# Patient Record
Sex: FEMALE | Race: WHITE
[De-identification: names, ages, dates, MRNs, and addresses within clinical notes are randomized per-mention and may not be internally consistent; named-entity substitution may affect disease eponyms.]

---

## 2017-06-07 ENCOUNTER — HOSPITAL ENCOUNTER (OUTPATIENT)
Dept: HOSPITAL 45 - C.LABSPEC | Age: 32
Discharge: HOME | End: 2017-06-07
Attending: OBSTETRICS & GYNECOLOGY
Payer: COMMERCIAL

## 2017-06-07 DIAGNOSIS — Z34.90: Primary | ICD-10-CM

## 2017-06-07 LAB
APPEARANCE UR: CLEAR
BILIRUB UR-MCNC: (no result) MG/DL
COLOR UR: YELLOW
MANUAL MICROSCOPIC REQUIRED?: NO
NITRITE UR QL STRIP: (no result)
PH UR STRIP: 7 [PH] (ref 4.5–7.5)
REVIEW REQ?: NO
SP GR UR STRIP: 1.01 (ref 1–1.03)
URINE BILL WITH OR WITHOUT MIC: (no result)
UROBILINOGEN UR-MCNC: (no result) MG/DL

## 2017-06-15 ENCOUNTER — HOSPITAL ENCOUNTER (OUTPATIENT)
Dept: HOSPITAL 45 - C.PAPS | Age: 32
Discharge: HOME | End: 2017-06-15
Attending: OBSTETRICS & GYNECOLOGY
Payer: COMMERCIAL

## 2017-06-15 ENCOUNTER — HOSPITAL ENCOUNTER (OUTPATIENT)
Dept: HOSPITAL 45 - C.LAB1850 | Age: 32
Discharge: HOME | End: 2017-06-15
Attending: OBSTETRICS & GYNECOLOGY
Payer: COMMERCIAL

## 2017-06-15 DIAGNOSIS — Z34.90: Primary | ICD-10-CM

## 2017-06-15 DIAGNOSIS — Z01.419: Primary | ICD-10-CM

## 2017-06-15 LAB
BASOPHILS # BLD: 0.01 K/UL (ref 0–0.2)
BASOPHILS NFR BLD: 0.1 %
COMPLETE: YES
EOSINOPHIL NFR BLD AUTO: 204 K/UL (ref 130–400)
HCT VFR BLD CALC: 39.8 % (ref 37–47)
IG%: 0.1 %
IMM GRANULOCYTES NFR BLD AUTO: 20.2 %
LYMPHOCYTES # BLD: 1.39 K/UL (ref 1.2–3.4)
MCH RBC QN AUTO: 32.5 PG (ref 25–34)
MCHC RBC AUTO-ENTMCNC: 34.7 G/DL (ref 32–36)
MCV RBC AUTO: 93.6 FL (ref 80–100)
MONOCYTES NFR BLD: 5.5 %
NEUTROPHILS # BLD AUTO: 0.6 %
NEUTROPHILS NFR BLD AUTO: 73.5 %
PMV BLD AUTO: 10 FL (ref 7.4–10.4)
RBC # BLD AUTO: 4.25 M/UL (ref 4.2–5.4)
WBC # BLD AUTO: 6.89 K/UL (ref 4.8–10.8)

## 2017-06-16 LAB
CHLAMYDIA TRACH RNA***: NOT DETECTED
GC (NEIS GONORRHOEAE)RNA**: NOT DETECTED

## 2017-06-28 ENCOUNTER — HOSPITAL ENCOUNTER (OUTPATIENT)
Dept: HOSPITAL 45 - C.LAB1850 | Age: 32
Discharge: HOME | End: 2017-06-28
Attending: OBSTETRICS & GYNECOLOGY
Payer: COMMERCIAL

## 2017-06-28 DIAGNOSIS — Z01.818: Primary | ICD-10-CM

## 2017-06-28 LAB
BASOPHILS # BLD: 0.01 K/UL (ref 0–0.2)
BASOPHILS NFR BLD: 0.1 %
COMPLETE: YES
EOSINOPHIL NFR BLD AUTO: 224 K/UL (ref 130–400)
HCT VFR BLD CALC: 38.8 % (ref 37–47)
IG%: 0.1 %
IMM GRANULOCYTES NFR BLD AUTO: 17.6 %
LYMPHOCYTES # BLD: 1.51 K/UL (ref 1.2–3.4)
MCH RBC QN AUTO: 32.1 PG (ref 25–34)
MCHC RBC AUTO-ENTMCNC: 34.8 G/DL (ref 32–36)
MCV RBC AUTO: 92.4 FL (ref 80–100)
MONOCYTES NFR BLD: 3.1 %
NEUTROPHILS # BLD AUTO: 0.5 %
NEUTROPHILS NFR BLD AUTO: 78.6 %
PMV BLD AUTO: 9.5 FL (ref 7.4–10.4)
RBC # BLD AUTO: 4.2 M/UL (ref 4.2–5.4)
WBC # BLD AUTO: 8.58 K/UL (ref 4.8–10.8)

## 2017-06-29 ENCOUNTER — HOSPITAL ENCOUNTER (OUTPATIENT)
Dept: HOSPITAL 45 - C.ACU | Age: 32
Discharge: HOME | End: 2017-06-29
Attending: OBSTETRICS & GYNECOLOGY
Payer: COMMERCIAL

## 2017-06-29 VITALS
HEART RATE: 75 BPM | OXYGEN SATURATION: 100 % | SYSTOLIC BLOOD PRESSURE: 110 MMHG | DIASTOLIC BLOOD PRESSURE: 78 MMHG | TEMPERATURE: 98.96 F

## 2017-06-29 VITALS — HEART RATE: 67 BPM | SYSTOLIC BLOOD PRESSURE: 109 MMHG | OXYGEN SATURATION: 100 % | DIASTOLIC BLOOD PRESSURE: 70 MMHG

## 2017-06-29 VITALS
HEIGHT: 64.02 IN | BODY MASS INDEX: 22.21 KG/M2 | HEIGHT: 64.02 IN | BODY MASS INDEX: 22.21 KG/M2 | WEIGHT: 130.07 LBS | WEIGHT: 130.07 LBS

## 2017-06-29 VITALS
DIASTOLIC BLOOD PRESSURE: 50 MMHG | HEART RATE: 74 BPM | OXYGEN SATURATION: 100 % | TEMPERATURE: 98.96 F | SYSTOLIC BLOOD PRESSURE: 106 MMHG

## 2017-06-29 VITALS
DIASTOLIC BLOOD PRESSURE: 65 MMHG | OXYGEN SATURATION: 100 % | TEMPERATURE: 99.32 F | HEART RATE: 65 BPM | SYSTOLIC BLOOD PRESSURE: 105 MMHG

## 2017-06-29 DIAGNOSIS — O02.1: Primary | ICD-10-CM

## 2017-06-29 NOTE — DISCHARGE INSTRUCTIONS
Discharge Instructions


Date of Service


2017.





Admission


Reason for Admission:  Missed 





Discharge


Discharge Diagnosis / Problem:  MAB





Discharge Goals


Goal(s):  Routine recovery after surgery





Activity Recommendations


Activity Limitations:  per Instructions/Follow-up section





.





Instructions / Follow-Up


Instructions / Follow-Up


ACTIVITY RECOMMENDATIONS:





*  Avoid tampons, douching, hot tubs, pools, and intercourse until bleeding has 

stopped.


*  May shower as usual.


*  No strenuous activity for 24-48 hours.  After 24-48 hours, you may do 

anything you feel


    like doing (driving and sports are okay).








SPECIAL CARE INSTRUCTIONS:





Special Diet:





*  Mild nausea may occur in the immediate post-operative period.  


*  Take clear liquids such as tea, cola or bouillon until all nausea has 

subsided; you may


    then resume your normal diet.





Special Care:





*  Light bleeding and vaginal spotting can last from a few days to 3-4 weeks.


   Call your doctor if bleeding becomes heavier than the heaviest part of your 

period.


*  Check your temperature twice a day for one week.  If it goes above 100.4 

degrees Fahrenheit


   (38.0 Celsius), notify your doctor.





*  Call your doctor's office for an appointment for 6 weeks after your surgery. 








FOLLOW-UP VISIT:





Call your doctor's office for an appointment for 6 weeks after your surgery.





Current Hospital Diet


Patient's current hospital diet:





Discharge Diet


Recommended Diet:  Regular Diet





Pending Studies


Studies pending at discharge:  no





Medical Emergencies








.


Who to Call and When:





Medical Emergencies:  If at any time you feel your situation is an emergency, 

please call 911 immediately.





.





Non-Emergent Contact


Non-Emergency issues call your:  Gynecologist





.


.








"Provider Documentation" section prepared by Petey Rico.








.





VTE Core Measure


Inpt VTE Proph given/why not?:  T.E.D. Stockings, SCD's

## 2017-06-29 NOTE — ANESTHESIOLOGY PROGRESS NOTE
Anesthesia Post Op Note


Date & Time


Jun 29, 2017 at 09:28





Vital Signs


Pain Intensity:  1.0





Vital Signs Past 12 Hours








  Date Time  Temp Pulse Resp B/P (MAP) Pulse Ox O2 Delivery O2 Flow Rate FiO2


 


6/29/17 07:42 37.2 75 18 110/78 (89) 100 Room Air  











Notes


Mental Status:  alert / awake / arousable, participated in evaluation


Pt Amnestic to Procedure:  Yes


Nausea / Vomiting:  adequately controlled


Pain:  adequately controlled


Airway Patency, RR, SpO2:  stable & adequate


BP & HR:  stable & adequate


Hydration State:  stable & adequate


Anesthetic Complications:  no major complications apparent

## 2017-06-29 NOTE — [facility] OPERATIVE REPORT
Operative Report


Operative Date


Jun 29, 2017.





Pre-Operative Diagnosis





MAB





Post-Operative Diagnosis


MAB





Procedure(s) Performed


Dilatation and evacuation with suction





Patient was given a general anesthetic prepped and draped in dorsal lithotomy 

position bladder draining uterus exam and uterus size approximately 8 weeks it 

was noted the patient had a right sided vaginal cyst.  Cervix was grabbed with 

a single-tooth tenaculum cervix then dilated methodically starting at a #13 

dilator and progressing to a #31 dilator.  At this stage we then placed a 10 mm 

curved curet suction into the uterus and suction was applied.  Products of 

conception were obtained.  A brief sharp curettage failed to reveal any further 

products of conception.  One last pass of suction and no more tissue could be 

found.  Bleeding was decreasing IV Pitocin started by anesthesia.  Sponge and 

sponge counts correct.





Surgeon


Jacky





Assistant Surgeon(s)


.





Estimated Blood Loss


100 ml





Findings


8 weeks size





Specimens





POC





Drains


None





Anesthesia


General





Complication(s)


None





Disposition


Recovery Room / PACU


I attest to the content of the Intraoperative Record and any orders documented 

therein.  Any exceptions are noted below.

## 2017-11-22 ENCOUNTER — HOSPITAL ENCOUNTER (OUTPATIENT)
Dept: HOSPITAL 45 - C.LABSPEC | Age: 32
Discharge: HOME | End: 2017-11-22
Attending: OBSTETRICS & GYNECOLOGY
Payer: COMMERCIAL

## 2017-11-22 DIAGNOSIS — Z3A.00: ICD-10-CM

## 2017-11-22 DIAGNOSIS — O09.299: Primary | ICD-10-CM

## 2017-11-22 LAB
APPEARANCE UR: (no result)
BILIRUB UR-MCNC: (no result) MG/DL
COLOR UR: YELLOW
MANUAL MICROSCOPIC REQUIRED?: NO
NITRITE UR QL STRIP: (no result)
PH UR STRIP: 7 [PH] (ref 4.5–7.5)
REVIEW REQ?: NO
SP GR UR STRIP: 1.02 (ref 1–1.03)
URINE BILL WITH OR WITHOUT MIC: (no result)
URINE EPITHELIAL CELL AUTO: (no result) /LPF (ref 0–5)
UROBILINOGEN UR-MCNC: (no result) MG/DL

## 2017-11-27 ENCOUNTER — HOSPITAL ENCOUNTER (OUTPATIENT)
Dept: HOSPITAL 45 - C.LABSPEC | Age: 32
Discharge: HOME | End: 2017-11-27
Attending: OBSTETRICS & GYNECOLOGY
Payer: COMMERCIAL

## 2017-11-27 DIAGNOSIS — O09.291: Primary | ICD-10-CM

## 2017-11-27 DIAGNOSIS — Z3A.00: ICD-10-CM

## 2018-01-12 ENCOUNTER — HOSPITAL ENCOUNTER (OUTPATIENT)
Dept: HOSPITAL 45 - C.LAB1850 | Age: 33
Discharge: HOME | End: 2018-01-12
Attending: OBSTETRICS & GYNECOLOGY
Payer: COMMERCIAL

## 2018-01-12 DIAGNOSIS — O09.292: Primary | ICD-10-CM

## 2018-01-23 ENCOUNTER — HOSPITAL ENCOUNTER (OUTPATIENT)
Dept: HOSPITAL 45 - C.LABSPEC | Age: 33
Discharge: HOME | End: 2018-01-23
Attending: OBSTETRICS & GYNECOLOGY
Payer: COMMERCIAL

## 2018-01-23 DIAGNOSIS — R39.89: Primary | ICD-10-CM

## 2018-04-09 ENCOUNTER — HOSPITAL ENCOUNTER (OUTPATIENT)
Dept: HOSPITAL 45 - C.LAB1850 | Age: 33
Discharge: HOME | End: 2018-04-09
Attending: OBSTETRICS & GYNECOLOGY
Payer: COMMERCIAL

## 2018-04-09 DIAGNOSIS — Z3A.00: ICD-10-CM

## 2018-04-09 DIAGNOSIS — O09.293: Primary | ICD-10-CM

## 2018-04-09 LAB
HCT VFR BLD CALC: 32.4 % (ref 37–47)
HGB BLD-MCNC: 11.5 G/DL (ref 12–16)